# Patient Record
Sex: MALE | Race: WHITE | NOT HISPANIC OR LATINO | Employment: OTHER | URBAN - METROPOLITAN AREA
[De-identification: names, ages, dates, MRNs, and addresses within clinical notes are randomized per-mention and may not be internally consistent; named-entity substitution may affect disease eponyms.]

---

## 2021-08-20 ENCOUNTER — APPOINTMENT (OUTPATIENT)
Dept: CT IMAGING | Facility: CLINIC | Age: 71
End: 2021-08-20
Attending: INTERNAL MEDICINE
Payer: COMMERCIAL

## 2021-08-20 ENCOUNTER — HOSPITAL ENCOUNTER (EMERGENCY)
Facility: CLINIC | Age: 71
Discharge: ANOTHER HOSPITAL | End: 2021-08-20
Attending: INTERNAL MEDICINE | Admitting: INTERNAL MEDICINE
Payer: COMMERCIAL

## 2021-08-20 VITALS
TEMPERATURE: 98.7 F | HEART RATE: 96 BPM | DIASTOLIC BLOOD PRESSURE: 109 MMHG | SYSTOLIC BLOOD PRESSURE: 152 MMHG | OXYGEN SATURATION: 95 % | RESPIRATION RATE: 18 BRPM

## 2021-08-20 DIAGNOSIS — I62.00 SUBDURAL HEMORRHAGE (H): ICD-10-CM

## 2021-08-20 LAB
ALBUMIN SERPL-MCNC: 4.2 G/DL (ref 3.4–5)
ALP SERPL-CCNC: 45 U/L (ref 40–150)
ALT SERPL W P-5'-P-CCNC: 21 U/L (ref 0–70)
ANION GAP SERPL CALCULATED.3IONS-SCNC: 6 MMOL/L (ref 3–14)
AST SERPL W P-5'-P-CCNC: 15 U/L (ref 0–45)
BASOPHILS # BLD AUTO: 0.1 10E3/UL (ref 0–0.2)
BASOPHILS NFR BLD AUTO: 1 %
BILIRUB SERPL-MCNC: 0.4 MG/DL (ref 0.2–1.3)
BUN SERPL-MCNC: 15 MG/DL (ref 7–30)
CALCIUM SERPL-MCNC: 9 MG/DL (ref 8.5–10.1)
CHLORIDE BLD-SCNC: 106 MMOL/L (ref 94–109)
CO2 SERPL-SCNC: 25 MMOL/L (ref 20–32)
CREAT SERPL-MCNC: 0.86 MG/DL (ref 0.66–1.25)
EOSINOPHIL # BLD AUTO: 0 10E3/UL (ref 0–0.7)
EOSINOPHIL NFR BLD AUTO: 0 %
ERYTHROCYTE [DISTWIDTH] IN BLOOD BY AUTOMATED COUNT: 13.1 % (ref 10–15)
GFR SERPL CREATININE-BSD FRML MDRD: 88 ML/MIN/1.73M2
GLUCOSE BLD-MCNC: 119 MG/DL (ref 70–99)
HCT VFR BLD AUTO: 44.9 % (ref 40–53)
HGB BLD-MCNC: 14.9 G/DL (ref 13.3–17.7)
IMM GRANULOCYTES # BLD: 0 10E3/UL
IMM GRANULOCYTES NFR BLD: 0 %
INR PPP: 1.01 (ref 0.85–1.15)
LYMPHOCYTES # BLD AUTO: 1 10E3/UL (ref 0.8–5.3)
LYMPHOCYTES NFR BLD AUTO: 10 %
MCH RBC QN AUTO: 30.5 PG (ref 26.5–33)
MCHC RBC AUTO-ENTMCNC: 33.2 G/DL (ref 31.5–36.5)
MCV RBC AUTO: 92 FL (ref 78–100)
MONOCYTES # BLD AUTO: 1 10E3/UL (ref 0–1.3)
MONOCYTES NFR BLD AUTO: 10 %
NEUTROPHILS # BLD AUTO: 7.9 10E3/UL (ref 1.6–8.3)
NEUTROPHILS NFR BLD AUTO: 79 %
NRBC # BLD AUTO: 0 10E3/UL
NRBC BLD AUTO-RTO: 0 /100
PLATELET # BLD AUTO: 235 10E3/UL (ref 150–450)
POTASSIUM BLD-SCNC: 3.6 MMOL/L (ref 3.4–5.3)
PROT SERPL-MCNC: 8 G/DL (ref 6.8–8.8)
RBC # BLD AUTO: 4.89 10E6/UL (ref 4.4–5.9)
SODIUM SERPL-SCNC: 137 MMOL/L (ref 133–144)
WBC # BLD AUTO: 10 10E3/UL (ref 4–11)

## 2021-08-20 PROCEDURE — 70486 CT MAXILLOFACIAL W/O DYE: CPT

## 2021-08-20 PROCEDURE — 85025 COMPLETE CBC W/AUTO DIFF WBC: CPT | Performed by: INTERNAL MEDICINE

## 2021-08-20 PROCEDURE — 72125 CT NECK SPINE W/O DYE: CPT

## 2021-08-20 PROCEDURE — 70450 CT HEAD/BRAIN W/O DYE: CPT

## 2021-08-20 PROCEDURE — 80053 COMPREHEN METABOLIC PANEL: CPT | Performed by: INTERNAL MEDICINE

## 2021-08-20 PROCEDURE — 250N000013 HC RX MED GY IP 250 OP 250 PS 637: Performed by: INTERNAL MEDICINE

## 2021-08-20 PROCEDURE — 85610 PROTHROMBIN TIME: CPT | Performed by: INTERNAL MEDICINE

## 2021-08-20 PROCEDURE — 99285 EMERGENCY DEPT VISIT HI MDM: CPT | Mod: 25

## 2021-08-20 PROCEDURE — 36415 COLL VENOUS BLD VENIPUNCTURE: CPT | Performed by: INTERNAL MEDICINE

## 2021-08-20 RX ORDER — IBUPROFEN 600 MG/1
600 TABLET, FILM COATED ORAL ONCE
Status: COMPLETED | OUTPATIENT
Start: 2021-08-20 | End: 2021-08-20

## 2021-08-20 RX ADMIN — IBUPROFEN 600 MG: 600 TABLET ORAL at 21:56

## 2021-08-20 ASSESSMENT — ENCOUNTER SYMPTOMS
ARTHRALGIAS: 0
ABDOMINAL PAIN: 0
SHORTNESS OF BREATH: 0
VOMITING: 0
WOUND: 1
NUMBNESS: 0
NAUSEA: 0
HEADACHES: 1
WEAKNESS: 0
NECK PAIN: 0
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
## 2021-08-21 NOTE — ED NOTES
Report given to EMS. Pt transferring via ambulance to [facility]. Pt remains stable, no complaints. Vitals stable (hypertensive, MD aware).
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
## 2021-08-21 NOTE — ED NOTES
Report called to YANDY Smith at [facility]. Pt asymptomatic at this time. BP improved. Awaiting ambulance transfer.
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
## 2021-08-21 NOTE — ED NOTES
Asked MD if she would like to give pt something for HTN (now 161/102) and MD states she is okay with this BP. Will continue to monitor. Pt states headache 1/10.

## 2023-07-23 ENCOUNTER — HOSPITAL ENCOUNTER (EMERGENCY)
Facility: CLINIC | Age: 73
Discharge: HOME | End: 2023-07-23
Attending: EMERGENCY MEDICINE | Admitting: EMERGENCY MEDICINE
Payer: COMMERCIAL

## 2023-07-23 ENCOUNTER — APPOINTMENT (OUTPATIENT)
Dept: CT IMAGING | Facility: CLINIC | Age: 73
End: 2023-07-23
Attending: EMERGENCY MEDICINE
Payer: COMMERCIAL

## 2023-07-23 VITALS
RESPIRATION RATE: 22 BRPM | WEIGHT: 220 LBS | OXYGEN SATURATION: 91 % | DIASTOLIC BLOOD PRESSURE: 81 MMHG | HEART RATE: 83 BPM | SYSTOLIC BLOOD PRESSURE: 152 MMHG | TEMPERATURE: 97.6 F | BODY MASS INDEX: 36.65 KG/M2 | HEIGHT: 65 IN

## 2023-07-23 DIAGNOSIS — R10.32 LLQ ABDOMINAL PAIN: ICD-10-CM

## 2023-07-23 DIAGNOSIS — M54.50 ACUTE LEFT-SIDED LOW BACK PAIN WITHOUT SCIATICA: ICD-10-CM

## 2023-07-23 LAB
ALBUMIN SERPL BCG-MCNC: 4.7 G/DL (ref 3.5–5.2)
ALBUMIN UR-MCNC: 50 MG/DL
ALP SERPL-CCNC: 46 U/L (ref 40–129)
ALT SERPL W P-5'-P-CCNC: 13 U/L (ref 0–70)
ANION GAP SERPL CALCULATED.3IONS-SCNC: 16 MMOL/L (ref 7–15)
APPEARANCE UR: CLEAR
AST SERPL W P-5'-P-CCNC: 20 U/L (ref 0–45)
BASOPHILS # BLD AUTO: 0 10E3/UL (ref 0–0.2)
BASOPHILS NFR BLD AUTO: 0 %
BILIRUB SERPL-MCNC: 0.7 MG/DL
BILIRUB UR QL STRIP: NEGATIVE
BUN SERPL-MCNC: 13.6 MG/DL (ref 8–23)
CALCIUM SERPL-MCNC: 9.4 MG/DL (ref 8.8–10.2)
CHLORIDE SERPL-SCNC: 104 MMOL/L (ref 98–107)
COLOR UR AUTO: ABNORMAL
CREAT SERPL-MCNC: 0.91 MG/DL (ref 0.67–1.17)
DEPRECATED HCO3 PLAS-SCNC: 22 MMOL/L (ref 22–29)
EOSINOPHIL # BLD AUTO: 0.1 10E3/UL (ref 0–0.7)
EOSINOPHIL NFR BLD AUTO: 1 %
ERYTHROCYTE [DISTWIDTH] IN BLOOD BY AUTOMATED COUNT: 13.9 % (ref 10–15)
GFR SERPL CREATININE-BSD FRML MDRD: 90 ML/MIN/1.73M2
GLUCOSE SERPL-MCNC: 123 MG/DL (ref 70–99)
GLUCOSE UR STRIP-MCNC: NEGATIVE MG/DL
HCT VFR BLD AUTO: 49.2 % (ref 40–53)
HGB BLD-MCNC: 17.2 G/DL (ref 13.3–17.7)
HGB UR QL STRIP: NEGATIVE
IMM GRANULOCYTES # BLD: 0 10E3/UL
IMM GRANULOCYTES NFR BLD: 0 %
KETONES UR STRIP-MCNC: NEGATIVE MG/DL
LEUKOCYTE ESTERASE UR QL STRIP: NEGATIVE
LYMPHOCYTES # BLD AUTO: 1.6 10E3/UL (ref 0.8–5.3)
LYMPHOCYTES NFR BLD AUTO: 17 %
MCH RBC QN AUTO: 31.6 PG (ref 26.5–33)
MCHC RBC AUTO-ENTMCNC: 35 G/DL (ref 31.5–36.5)
MCV RBC AUTO: 90 FL (ref 78–100)
MONOCYTES # BLD AUTO: 0.8 10E3/UL (ref 0–1.3)
MONOCYTES NFR BLD AUTO: 9 %
MUCOUS THREADS #/AREA URNS LPF: PRESENT /LPF
NEUTROPHILS # BLD AUTO: 6.8 10E3/UL (ref 1.6–8.3)
NEUTROPHILS NFR BLD AUTO: 73 %
NITRATE UR QL: NEGATIVE
NRBC # BLD AUTO: 0 10E3/UL
NRBC BLD AUTO-RTO: 0 /100
PH UR STRIP: 7 [PH] (ref 5–7)
PLATELET # BLD AUTO: 202 10E3/UL (ref 150–450)
POTASSIUM SERPL-SCNC: 3.3 MMOL/L (ref 3.4–5.3)
PROT SERPL-MCNC: 7.5 G/DL (ref 6.4–8.3)
RBC # BLD AUTO: 5.45 10E6/UL (ref 4.4–5.9)
RBC URINE: <1 /HPF
SODIUM SERPL-SCNC: 142 MMOL/L (ref 136–145)
SP GR UR STRIP: 1.01 (ref 1–1.03)
UROBILINOGEN UR STRIP-MCNC: NORMAL MG/DL
WBC # BLD AUTO: 9.3 10E3/UL (ref 4–11)
WBC URINE: 1 /HPF

## 2023-07-23 PROCEDURE — 85025 COMPLETE CBC W/AUTO DIFF WBC: CPT | Performed by: EMERGENCY MEDICINE

## 2023-07-23 PROCEDURE — 99285 EMERGENCY DEPT VISIT HI MDM: CPT | Mod: 25

## 2023-07-23 PROCEDURE — 96374 THER/PROPH/DIAG INJ IV PUSH: CPT

## 2023-07-23 PROCEDURE — 36415 COLL VENOUS BLD VENIPUNCTURE: CPT | Performed by: EMERGENCY MEDICINE

## 2023-07-23 PROCEDURE — 80053 COMPREHEN METABOLIC PANEL: CPT | Performed by: EMERGENCY MEDICINE

## 2023-07-23 PROCEDURE — 81001 URINALYSIS AUTO W/SCOPE: CPT | Performed by: EMERGENCY MEDICINE

## 2023-07-23 PROCEDURE — 258N000003 HC RX IP 258 OP 636: Performed by: EMERGENCY MEDICINE

## 2023-07-23 PROCEDURE — 74176 CT ABD & PELVIS W/O CONTRAST: CPT

## 2023-07-23 PROCEDURE — 96375 TX/PRO/DX INJ NEW DRUG ADDON: CPT

## 2023-07-23 PROCEDURE — 96361 HYDRATE IV INFUSION ADD-ON: CPT

## 2023-07-23 PROCEDURE — 250N000011 HC RX IP 250 OP 636: Mod: JZ | Performed by: EMERGENCY MEDICINE

## 2023-07-23 RX ORDER — ONDANSETRON 2 MG/ML
4 INJECTION INTRAMUSCULAR; INTRAVENOUS EVERY 30 MIN PRN
Status: DISCONTINUED | OUTPATIENT
Start: 2023-07-23 | End: 2023-07-23 | Stop reason: HOSPADM

## 2023-07-23 RX ORDER — OXYCODONE HYDROCHLORIDE 5 MG/1
5 TABLET ORAL EVERY 6 HOURS PRN
Qty: 6 TABLET | Refills: 0 | Status: SHIPPED | OUTPATIENT
Start: 2023-07-23 | End: 2023-07-26

## 2023-07-23 RX ORDER — CYCLOBENZAPRINE HCL 10 MG
10 TABLET ORAL 3 TIMES DAILY PRN
Qty: 20 TABLET | Refills: 0 | Status: SHIPPED | OUTPATIENT
Start: 2023-07-23 | End: 2023-07-30

## 2023-07-23 RX ADMIN — SODIUM CHLORIDE 1000 ML: 9 INJECTION, SOLUTION INTRAVENOUS at 10:57

## 2023-07-23 RX ADMIN — ONDANSETRON 4 MG: 2 INJECTION INTRAMUSCULAR; INTRAVENOUS at 10:56

## 2023-07-23 RX ADMIN — HYDROMORPHONE HYDROCHLORIDE 1 MG: 1 INJECTION, SOLUTION INTRAMUSCULAR; INTRAVENOUS; SUBCUTANEOUS at 10:56

## 2023-07-23 ASSESSMENT — ACTIVITIES OF DAILY LIVING (ADL)
ADLS_ACUITY_SCORE: 37
ADLS_ACUITY_SCORE: 37

## 2023-07-23 NOTE — ED PROVIDER NOTES
"  History     Chief Complaint:  No chief complaint on file.       HPI   Sean Chicas is a 73 year old male who presents with 1 week of worsening left lower back pain now radiating to the front on the left side.  Patient has history of muscle spasm in the past.  He started acting up about a week ago.  He went to see [facility] few days ago and got a muscle relaxant to take.  He thought it was working at times but then did not work today.  He was in the shower this morning he was feeling okay but then pain got worse when he stood up.  He was bending over to get dressed and clip his toenails.  He states he feels nauseous with this pain.  The pain is a crampy feeling that shoots into his abdomen.  He denies any pain going down his legs or in his foot.  He denies any numbness and tingling.  He denies any previous back surgeries or injuries.  Denies any urinary concerns.  He did receive x-ray at [facility] and was told that it did not show anything bad.      Independent Historian:   None - Patient Only    Review of External Notes:   none      Medications:    ACIPHEX 20 MG OR TBEC  ASPIRIN CAPS 81 MG OR  HYDROCHLOROTHIAZIDE TABS 25 MG OR  LOTRISONE CREA 1-0.05 % EX  VIAGRA 100 MG OR TABS        Past Medical History:    Past Medical History:   Diagnosis Date     Diverticulosis of colon (without mention of hemorrhage)      Impotence of organic origin      Inguinal hernia without mention of obstruction or gangrene, unilateral or unspecified, (not specified as recurrent)      Obesity, unspecified      Unspecified essential hypertension        Past Surgical History:    Past Surgical History:   Procedure Laterality Date     HC REPAIR ING HERNIA,5+Y/O,REDUCIBL      Inguinal hernia repair - abstract        Physical Exam     Patient Vitals for the past 24 hrs:   BP Temp Temp src Pulse Resp SpO2 Height Weight   07/23/23 1031 (!) 181/104 97.6  F (36.4  C) Oral 76 22 96 % 1.651 m (5' 5\") 99.8 kg " (220 lb)        Physical Exam  Constitutional:       Appearance: He is well-developed.   HENT:      Right Ear: External ear normal.      Left Ear: External ear normal.      Mouth/Throat:      Mouth: Mucous membranes are moist.      Pharynx: Oropharynx is clear. No oropharyngeal exudate.   Eyes:      General: No scleral icterus.     Conjunctiva/sclera: Conjunctivae normal.      Pupils: Pupils are equal, round, and reactive to light.   Cardiovascular:      Rate and Rhythm: Normal rate and regular rhythm.      Heart sounds: Normal heart sounds. No murmur heard.     No friction rub. No gallop.   Pulmonary:      Effort: Pulmonary effort is normal. No respiratory distress.      Breath sounds: Normal breath sounds. No wheezing or rales.   Abdominal:      General: Bowel sounds are normal. There is no distension.      Palpations: Abdomen is soft. There is no mass.      Tenderness: There is abdominal tenderness. There is no right CVA tenderness or left CVA tenderness.      Comments: L lower flank and LLQ TTP   Musculoskeletal:         General: Normal range of motion.      Comments: 5/5 strength in all extremities. Normal light touch sensation to BLE with normal pulses. No deficits on exam.    Skin:     General: Skin is warm and dry.      Capillary Refill: Capillary refill takes less than 2 seconds.      Findings: No rash.   Neurological:      Mental Status: He is alert.           Emergency Department Course   ECG      Imaging:  CT Abdomen Pelvis w/o Contrast   Final Result   IMPRESSION:    1.  Extensive diverticulosis without diverticulitis.   2.  No renal, ureteral, or bladder stone.               Report per radiology    Laboratory:  Labs Ordered and Resulted from Time of ED Arrival to Time of ED Departure   COMPREHENSIVE METABOLIC PANEL - Abnormal       Result Value    Sodium 142      Potassium 3.3 (*)     Chloride 104      Carbon Dioxide (CO2) 22      Anion Gap 16 (*)     Urea Nitrogen 13.6      Creatinine 0.91       Calcium 9.4      Glucose 123 (*)     Alkaline Phosphatase 46      AST 20      ALT 13      Protein Total 7.5      Albumin 4.7      Bilirubin Total 0.7      GFR Estimate 90     ROUTINE UA WITH MICROSCOPIC REFLEX TO CULTURE - Abnormal    Color Urine Light Yellow      Appearance Urine Clear      Glucose Urine Negative      Bilirubin Urine Negative      Ketones Urine Negative      Specific Gravity Urine 1.012      Blood Urine Negative      pH Urine 7.0      Protein Albumin Urine 50 (*)     Urobilinogen Urine Normal      Nitrite Urine Negative      Leukocyte Esterase Urine Negative      Mucus Urine Present (*)     RBC Urine <1      WBC Urine 1     CBC WITH PLATELETS AND DIFFERENTIAL    WBC Count 9.3      RBC Count 5.45      Hemoglobin 17.2      Hematocrit 49.2      MCV 90      MCH 31.6      MCHC 35.0      RDW 13.9      Platelet Count 202      % Neutrophils 73      % Lymphocytes 17      % Monocytes 9      % Eosinophils 1      % Basophils 0      % Immature Granulocytes 0      NRBCs per 100 WBC 0      Absolute Neutrophils 6.8      Absolute Lymphocytes 1.6      Absolute Monocytes 0.8      Absolute Eosinophils 0.1      Absolute Basophils 0.0      Absolute Immature Granulocytes 0.0      Absolute NRBCs 0.0          Emergency Department Course & Assessments:       Interventions:  Medications   ondansetron (ZOFRAN) injection 4 mg (4 mg Intravenous $Given 7/23/23 1056)   0.9% sodium chloride BOLUS (0 mLs Intravenous Stopped 7/23/23 1308)   HYDROmorphone (DILAUDID) injection 1 mg (1 mg Intravenous $Given 7/23/23 1056)        Assessments:  1030 Exam  1230 Recheck. Ambulated to bathroom ok    Independent Interpretation (X-rays, CTs, rhythm strip):  None    Consultations/Discussion of Management or Tests:  None        Social Determinants of Health affecting care:   None    Disposition:  The patient was discharged to home.     Impression & Plan        Medical Decision Making:  Patient presents today for evaluation of left-sided back  pain rating to left lower quadrant.  On exam he is tender left lower quadrant and appears to be uncomfortable.  Given his clinical symptoms, we performed labs as well as pain meds and a CT scan to rule out intra-abdominal causes.  He is much more comfortable at this point there is evidence of diverticulosis but no diverticulitis.  There is no kidney stones.  I think at this point the most likely causes low back pain possibly radiculopathy.  He does not have any classic radicular symptoms but the location of pain certainly makes it more suspicious.  He is referred back to [facility] for follow-up.  He is already finished the Flexeril that they gave him.  I went ahead and give him another prescription of that.  He is asked to continue with ibuprofen and Tylenol.  I did agree to give him small quantity oxycodone for severe pain to help him get through the next couple days.  He voiced understanding that he is to follow-up right away and will call tomorrow morning.  Return precautions provided.    Diagnosis:    ICD-10-CM    1. Acute left-sided low back pain without sciatica  M54.50       2. LLQ abdominal pain  R10.32            Discharge Medications:  New Prescriptions    CYCLOBENZAPRINE (FLEXERIL) 10 MG TABLET    Take 1 tablet (10 mg) by mouth 3 times daily as needed for muscle spasms    OXYCODONE (ROXICODONE) 5 MG TABLET    Take 1 tablet (5 mg) by mouth every 6 hours as needed for severe pain            7/23/2023   Baljeet Berman MD Cheng, Wenlan, MD  07/23/23 [id number]

## 2023-07-23 NOTE — ED TRIAGE NOTES
Has been having pain in back X1 week. Got muscle relaxer. Got imaging done at [facility]. Pain got worse today after standing up. Pain radiates to L hip and L lower abd.     Pt did not take his HTN meds today.          Triage Assessment     Row Name 07/23/23 1033       Triage Assessment (Adult)    Airway WDL WDL       Respiratory WDL    Respiratory WDL WDL       Skin Circulation/Temperature WDL    Skin Circulation/Temperature WDL WDL       Cardiac WDL    Cardiac WDL WDL       Peripheral/Neurovascular WDL    Peripheral Neurovascular WDL WDL       Cognitive/Neuro/Behavioral WDL    Cognitive/Neuro/Behavioral WDL WDL

## 2023-07-23 NOTE — DISCHARGE INSTRUCTIONS
Continue muscle relaxant. May use oxycodone for severe pain  Follow up with [facility] on your back pain. You may need MRI to see if you have pinched nerve  Return for worsening symptoms, leg weakness or numbness, or problem with urine